# Patient Record
Sex: FEMALE | Race: WHITE | URBAN - NONMETROPOLITAN AREA
[De-identification: names, ages, dates, MRNs, and addresses within clinical notes are randomized per-mention and may not be internally consistent; named-entity substitution may affect disease eponyms.]

---

## 2023-09-25 ENCOUNTER — OFFICE VISIT (OUTPATIENT)
Dept: URBAN - NONMETROPOLITAN AREA CLINIC 4 | Facility: CLINIC | Age: 48
End: 2023-09-25
Payer: COMMERCIAL

## 2023-09-25 ENCOUNTER — WEB ENCOUNTER (OUTPATIENT)
Dept: URBAN - NONMETROPOLITAN AREA CLINIC 4 | Facility: CLINIC | Age: 48
End: 2023-09-25

## 2023-09-25 VITALS
DIASTOLIC BLOOD PRESSURE: 77 MMHG | HEART RATE: 114 BPM | TEMPERATURE: 97.7 F | SYSTOLIC BLOOD PRESSURE: 128 MMHG | WEIGHT: 121.6 LBS | HEIGHT: 66 IN | BODY MASS INDEX: 19.54 KG/M2

## 2023-09-25 DIAGNOSIS — R79.89 ELEVATED LFTS: ICD-10-CM

## 2023-09-25 DIAGNOSIS — Z12.11 SCREENING FOR COLON CANCER: ICD-10-CM

## 2023-09-25 DIAGNOSIS — R17 JAUNDICE: ICD-10-CM

## 2023-09-25 DIAGNOSIS — K75.9 HEPATITIS: ICD-10-CM

## 2023-09-25 PROCEDURE — 99215 OFFICE O/P EST HI 40 MIN: CPT | Performed by: PHYSICIAN ASSISTANT

## 2023-09-25 RX ORDER — PROPRANOLOL HYDROCHLORIDE 20 MG/1
1 TABLET TABLET ORAL THREE TIMES A DAY
Status: ACTIVE | COMMUNITY

## 2023-09-25 NOTE — PHYSICAL EXAM SKIN:
no rashes, jaundice present,  good turgor  telangectasias (vs irritation from scratching) noted to chest

## 2023-09-25 NOTE — HPI-TODAY'S VISIT:
Lela nicholson is a 51 year old  female with past medical history of alcohol use presents to the office today secondary to new onset painless jaundice which she noticed on her skin and whites of her eyes.  She noticed some skin itching recently, and the whites of her eyes were found to be yellow. She went to an urgent care and routine labs were done which revealed an AST of 178 with an ALT of 65 and a total bilirubin of 9.5  She denies any abdominal pain, states that her last drink was five days ago and she has every intention of being sober long term  Her itching has resolved.  About five years ago she was admitted to the hospital for alcohol withdrawal, was sober for about two years after that episode and recently started drinking again  No family history of liver disease, no new medications recently

## 2023-09-25 NOTE — PHYSICAL EXAM CONSTITUTIONAL:
normal,  alert,  pleasant, well nourished, in no acute distress,  well developed, well nourished,  ambulating without difficulty  thin, but does not appear malnourished

## 2023-09-26 LAB
ABSOLUTE BASOPHILS: 75
ABSOLUTE EOSINOPHILS: 25
ABSOLUTE LYMPHOCYTES: 2025
ABSOLUTE MONOCYTES: 1325
ABSOLUTE NEUTROPHILS: 9050
ALBUMIN/GLOBULIN RATIO: 0.9
ALBUMIN: 3.6
ALKALINE PHOSPHATASE: 247
ALT: 63
AST: 157
BASOPHILS: 0.6
BILIRUBIN, TOTAL: 10
BUN/CREATININE RATIO: (no result)
CALCIUM: 8.8
CARBON DIOXIDE: 26
CHLORIDE: 97
CREATININE: 0.57
EGFR: 110
EOSINOPHILS: 0.2
FIB 4 INDEX: 4.2
FIB 4 INTERPRETATION: (no result)
GLOBULIN: 3.8
GLUCOSE: 88
HEMATOCRIT: 30.1
HEMOGLOBIN: 10.9
INR: 1.7
LYMPHOCYTES: 16.2
MCH: 37.5
MCHC: 36.2
MCV: 103.4
MONOCYTES: 10.6
MPV: 11.6
NEUTROPHILS: 72.4
PLATELET COUNT: 240
PLATELET COUNT: 240
POTASSIUM: 4.4
PROTEIN, TOTAL: 7.4
PT: 17.1
RDW: 14.2
RED BLOOD CELL COUNT: 2.91
SODIUM: 132
UREA NITROGEN (BUN): 10
WHITE BLOOD CELL COUNT: 12.5

## 2023-09-27 ENCOUNTER — TELEPHONE ENCOUNTER (OUTPATIENT)
Dept: URBAN - NONMETROPOLITAN AREA CLINIC 4 | Facility: CLINIC | Age: 48
End: 2023-09-27

## 2023-09-29 ENCOUNTER — TELEPHONE ENCOUNTER (OUTPATIENT)
Dept: URBAN - NONMETROPOLITAN AREA CLINIC 4 | Facility: CLINIC | Age: 48
End: 2023-09-29

## 2023-09-29 ENCOUNTER — LAB OUTSIDE AN ENCOUNTER (OUTPATIENT)
Dept: URBAN - NONMETROPOLITAN AREA CLINIC 4 | Facility: CLINIC | Age: 48
End: 2023-09-29

## 2023-10-02 ENCOUNTER — TELEPHONE ENCOUNTER (OUTPATIENT)
Dept: URBAN - METROPOLITAN AREA CLINIC 54 | Facility: CLINIC | Age: 48
End: 2023-10-02

## 2023-10-04 ENCOUNTER — TELEPHONE ENCOUNTER (OUTPATIENT)
Dept: URBAN - NONMETROPOLITAN AREA CLINIC 4 | Facility: CLINIC | Age: 48
End: 2023-10-04

## 2023-10-04 ENCOUNTER — OFFICE VISIT (OUTPATIENT)
Dept: URBAN - NONMETROPOLITAN AREA CLINIC 4 | Facility: CLINIC | Age: 48
End: 2023-10-04
Payer: COMMERCIAL

## 2023-10-04 VITALS
HEART RATE: 95 BPM | TEMPERATURE: 97.5 F | SYSTOLIC BLOOD PRESSURE: 121 MMHG | DIASTOLIC BLOOD PRESSURE: 79 MMHG | WEIGHT: 126 LBS | HEIGHT: 66 IN | BODY MASS INDEX: 20.25 KG/M2

## 2023-10-04 DIAGNOSIS — K75.9 HEPATITIS: ICD-10-CM

## 2023-10-04 DIAGNOSIS — R79.89 ELEVATED LFTS: ICD-10-CM

## 2023-10-04 DIAGNOSIS — K70.31 ALCOHOLIC CIRRHOSIS OF LIVER WITH ASCITES: ICD-10-CM

## 2023-10-04 DIAGNOSIS — R17 JAUNDICE: ICD-10-CM

## 2023-10-04 PROCEDURE — 996 AG2 (NON BILLABLE): Performed by: PHYSICIAN ASSISTANT

## 2023-10-04 RX ORDER — SPIRONOLACTONE 25 MG/1
1 TABLET TABLET, FILM COATED ORAL DAILY
Qty: 30 TABLET | Refills: 0 | OUTPATIENT
Start: 2023-10-04 | End: 2023-11-03

## 2023-10-04 RX ORDER — PROPRANOLOL HYDROCHLORIDE 20 MG/1
1 TABLET TABLET ORAL THREE TIMES A DAY
Status: ACTIVE | COMMUNITY

## 2023-10-04 RX ORDER — ZOLPIDEM TARTRATE 5 MG/1
1 TABLET AT BEDTIME AS NEEDED TABLET, FILM COATED ORAL ONCE A DAY
Qty: 30 TABLET | Refills: 0 | OUTPATIENT
Start: 2023-10-05

## 2023-10-04 NOTE — HPI-TODAY'S VISIT:
Lela nicholson is a 51 year old  female with past medical history of alcohol use presents to the office today secondary to new onset painless jaundice which she noticed on her skin and whites of her eyes.  She noticed some skin itching recently, and the whites of her eyes were found to be yellow. She went to an urgent care and routine labs were done which revealed an AST of 178 with an ALT of 65 and a total bilirubin of 9.5  She denies any abdominal pain, states that her last drink was five days ago and she has every intention of being sober long term  Her itching has resolved.  About five years ago she was admitted to the hospital for alcohol withdrawal, was sober for about two years after that episode and recently started drinking again  No family history of liver disease, no new medications recently  10.4.23 Tracy asked patient to come back with her fiance given her multiple questions and anxiety regarding her new diagnosis  CT abd pel with cirrhosis, as well as ascites in pelvis, with abd/esophgeal varicies. No PVT, no HCC  She continues to look improved, has been sober from EtOH for 15 days and is using AA.  Her fiance is here with her, and he is very supportive.

## 2023-10-06 ENCOUNTER — TELEPHONE ENCOUNTER (OUTPATIENT)
Dept: URBAN - NONMETROPOLITAN AREA CLINIC 4 | Facility: CLINIC | Age: 48
End: 2023-10-06

## 2023-10-09 ENCOUNTER — TELEPHONE ENCOUNTER (OUTPATIENT)
Dept: URBAN - NONMETROPOLITAN AREA CLINIC 4 | Facility: CLINIC | Age: 48
End: 2023-10-09

## 2023-11-01 ENCOUNTER — OFFICE VISIT (OUTPATIENT)
Dept: URBAN - NONMETROPOLITAN AREA CLINIC 4 | Facility: CLINIC | Age: 48
End: 2023-11-01
Payer: COMMERCIAL

## 2023-11-01 ENCOUNTER — TELEPHONE ENCOUNTER (OUTPATIENT)
Dept: URBAN - NONMETROPOLITAN AREA CLINIC 4 | Facility: CLINIC | Age: 48
End: 2023-11-01

## 2023-11-01 VITALS
SYSTOLIC BLOOD PRESSURE: 159 MMHG | TEMPERATURE: 97.9 F | BODY MASS INDEX: 20.06 KG/M2 | DIASTOLIC BLOOD PRESSURE: 100 MMHG | HEART RATE: 120 BPM | HEIGHT: 66 IN | WEIGHT: 124.8 LBS

## 2023-11-01 DIAGNOSIS — R17 JAUNDICE: ICD-10-CM

## 2023-11-01 DIAGNOSIS — R79.89 ELEVATED LFTS: ICD-10-CM

## 2023-11-01 DIAGNOSIS — K75.89 CHOLESTATIC HEPATITIS: ICD-10-CM

## 2023-11-01 DIAGNOSIS — F10.10 ALCOHOL ABUSE: ICD-10-CM

## 2023-11-01 PROCEDURE — 99214 OFFICE O/P EST MOD 30 MIN: CPT | Performed by: PHYSICIAN ASSISTANT

## 2023-11-01 RX ORDER — SPIRONOLACTONE 25 MG/1
1 TABLET TABLET, FILM COATED ORAL DAILY
Qty: 30 TABLET | Refills: 0 | Status: ACTIVE | COMMUNITY
Start: 2023-10-04 | End: 2023-11-03

## 2023-11-01 RX ORDER — ZOLPIDEM TARTRATE 5 MG/1
1 TABLET AT BEDTIME AS NEEDED TABLET, FILM COATED ORAL ONCE A DAY
Qty: 30 TABLET | Refills: 0 | Status: ACTIVE | COMMUNITY
Start: 2023-10-05

## 2023-11-01 NOTE — HPI-TODAY'S VISIT:
Lela nicholson is a 51 year old  female with past medical history of alcohol use presents to the office today secondary to new onset painless jaundice which she noticed on her skin and whites of her eyes.  She noticed some skin itching recently, and the whites of her eyes were found to be yellow. She went to an urgent care and routine labs were done which revealed an AST of 178 with an ALT of 65 and a total bilirubin of 9.5  She denies any abdominal pain, states that her last drink was five days ago and she has every intention of being sober long term  Her itching has resolved.  About five years ago she was admitted to the hospital for alcohol withdrawal, was sober for about two years after that episode and recently started drinking again  No family history of liver disease, no new medications recently  10.4.23 Tracy asked patient to come back with her fiance given her multiple questions and anxiety regarding her new diagnosis  CT abd pel with cirrhosis, as well as ascites in pelvis, with abd/esophgeal varicies. No PVT, no HCC  She continues to look improved, has been sober from EtOH for 15 days and is using AA.  Her fiance is here with her, and he is very supportive.  11.1.23 Remains sober, now 45 days. Feeling great, no bloating, no LE edema.  No further jaundice, eyes with minimally icteric sclerae  Has been watching her salt, and overall says bowels, body, skin etc much improved.

## 2023-11-02 ENCOUNTER — WEB ENCOUNTER (OUTPATIENT)
Dept: URBAN - NONMETROPOLITAN AREA CLINIC 4 | Facility: CLINIC | Age: 48
End: 2023-11-02

## 2023-11-02 LAB
ALBUMIN/GLOBULIN RATIO: 0.9
ALBUMIN: 3.4
ALKALINE PHOSPHATASE: 120
ALT: 25
AST: 42
BILIRUBIN, TOTAL: 2
BUN/CREATININE RATIO: 18
CALCIUM: 9
CARBON DIOXIDE: 24
CHLORIDE: 104
CREATININE: 0.49
EGFR: 116
FIB 4 INDEX: 2.71
FIB 4 INTERPRETATION: (no result)
GLOBULIN: 3.9
GLUCOSE: 101
INR: 1.2
PLATELET COUNT: 149
POTASSIUM: 3.7
PROTEIN, TOTAL: 7.3
PT: 13
SODIUM: 136
UREA NITROGEN (BUN): 9

## 2023-11-03 ENCOUNTER — TELEPHONE ENCOUNTER (OUTPATIENT)
Dept: URBAN - NONMETROPOLITAN AREA CLINIC 4 | Facility: CLINIC | Age: 48
End: 2023-11-03

## 2023-11-03 RX ORDER — ZOLPIDEM TARTRATE 5 MG/1
1 TABLET AT BEDTIME AS NEEDED TABLET, FILM COATED ORAL ONCE A DAY
Qty: 30 TABLET | Refills: 0
Start: 2023-10-05

## 2023-11-21 ENCOUNTER — WEB ENCOUNTER (OUTPATIENT)
Dept: URBAN - NONMETROPOLITAN AREA CLINIC 4 | Facility: CLINIC | Age: 48
End: 2023-11-21

## 2023-11-21 RX ORDER — ZOLPIDEM TARTRATE 5 MG/1
1 TABLET AT BEDTIME AS NEEDED TABLET, FILM COATED ORAL ONCE A DAY
Qty: 30 TABLET | Refills: 0
Start: 2023-10-05

## 2023-11-29 ENCOUNTER — WEB ENCOUNTER (OUTPATIENT)
Dept: URBAN - NONMETROPOLITAN AREA CLINIC 4 | Facility: CLINIC | Age: 48
End: 2023-11-29

## 2023-12-22 ENCOUNTER — WEB ENCOUNTER (OUTPATIENT)
Dept: URBAN - NONMETROPOLITAN AREA CLINIC 4 | Facility: CLINIC | Age: 48
End: 2023-12-22

## 2024-01-10 ENCOUNTER — OFFICE VISIT (OUTPATIENT)
Dept: URBAN - NONMETROPOLITAN AREA CLINIC 4 | Facility: CLINIC | Age: 49
End: 2024-01-10
Payer: COMMERCIAL

## 2024-01-10 VITALS
BODY MASS INDEX: 21.18 KG/M2 | HEART RATE: 114 BPM | WEIGHT: 131.8 LBS | HEIGHT: 66 IN | SYSTOLIC BLOOD PRESSURE: 139 MMHG | DIASTOLIC BLOOD PRESSURE: 81 MMHG | TEMPERATURE: 98.2 F

## 2024-01-10 DIAGNOSIS — K75.9 HEPATITIS: ICD-10-CM

## 2024-01-10 DIAGNOSIS — R79.89 ELEVATED LFTS: ICD-10-CM

## 2024-01-10 DIAGNOSIS — R17 JAUNDICE: ICD-10-CM

## 2024-01-10 DIAGNOSIS — K70.31 ALCOHOLIC CIRRHOSIS OF LIVER WITH ASCITES: ICD-10-CM

## 2024-01-10 DIAGNOSIS — F10.10 ALCOHOL ABUSE: ICD-10-CM

## 2024-01-10 DIAGNOSIS — Z12.11 SCREENING FOR COLON CANCER: ICD-10-CM

## 2024-01-10 PROBLEM — 15167005: Status: ACTIVE | Noted: 2023-09-25

## 2024-01-10 PROBLEM — 863927004: Status: ACTIVE | Noted: 2023-09-25

## 2024-01-10 PROBLEM — 128241005: Status: ACTIVE | Noted: 2023-09-25

## 2024-01-10 PROBLEM — 420054005: Status: ACTIVE | Noted: 2023-10-04

## 2024-01-10 PROBLEM — 18165001: Status: ACTIVE | Noted: 2023-09-25

## 2024-01-10 PROCEDURE — 99215 OFFICE O/P EST HI 40 MIN: CPT | Performed by: PHYSICIAN ASSISTANT

## 2024-01-10 RX ORDER — MULTIVITAMIN
1 TABLET TABLET ORAL ONCE A DAY
Status: ACTIVE | COMMUNITY

## 2024-01-10 RX ORDER — ZOLPIDEM TARTRATE 5 MG/1
1 TABLET AT BEDTIME AS NEEDED TABLET, FILM COATED ORAL ONCE A DAY
Qty: 30 TABLET | Refills: 0 | Status: ACTIVE | COMMUNITY
Start: 2023-10-05

## 2024-01-10 NOTE — HPI-TODAY'S VISIT:
Lela nicholson is a 51 year old  female with past medical history of alcohol use presents to the office today secondary to new onset painless jaundice which she noticed on her skin and whites of her eyes.  She noticed some skin itching recently, and the whites of her eyes were found to be yellow. She went to an urgent care and routine labs were done which revealed an AST of 178 with an ALT of 65 and a total bilirubin of 9.5  She denies any abdominal pain, states that her last drink was five days ago and she has every intention of being sober long term  Her itching has resolved.  About five years ago she was admitted to the hospital for alcohol withdrawal, was sober for about two years after that episode and recently started drinking again  No family history of liver disease, no new medications recently  10.4.23 Tracy asked patient to come back with her fiance given her multiple questions and anxiety regarding her new diagnosis  CT abd pel with cirrhosis, as well as ascites in pelvis, with abd/esophgeal varicies. No PVT, no HCC  She continues to look improved, has been sober from EtOH for 15 days and is using AA.  Her fiance is here with her, and he is very supportive.  11.1.23 Remains sober, now 45 days. Feeling great, no bloating, no LE edema.  No further jaundice, eyes with minimally icteric sclerae  Has been watching her salt, and overall says bowels, body, skin etc much improved.  1.10.24 Doing really well.  Compensated clincally with no ascites, no LE edema and no jaundice nor PSE  Her last set of labs were with normal INR and normal LFTs  She has been sober 110 days  No previous EGD for EV screening, although imaging with no EVs and o previous cscope for screening.  Grandfather with colon CA at 90

## 2024-01-11 ENCOUNTER — TELEPHONE ENCOUNTER (OUTPATIENT)
Dept: URBAN - METROPOLITAN AREA CLINIC 54 | Facility: CLINIC | Age: 49
End: 2024-01-11

## 2024-01-11 LAB
ABSOLUTE BASOPHILS: 19
ABSOLUTE EOSINOPHILS: 101
ABSOLUTE LYMPHOCYTES: 2564
ABSOLUTE MONOCYTES: 914
ABSOLUTE NEUTROPHILS: 2703
ALBUMIN/GLOBULIN RATIO: 1.3
ALBUMIN: 3.8
ALKALINE PHOSPHATASE: 95
ALT: 19
AST: 29
BASOPHILS: 0.3
BILIRUBIN, TOTAL: 0.8
BUN/CREATININE RATIO: (no result)
CALCIUM: 9.3
CARBON DIOXIDE: 25
CHLORIDE: 103
CREATININE: 0.52
EGFR: 115
EOSINOPHILS: 1.6
FIB 4 INDEX: 1.82
FIB 4 INTERPRETATION: (no result)
FIB 4 SUMMARY: (no result)
GLOBULIN: 3
GLUCOSE: 86
HEMATOCRIT: 33.9
HEMOGLOBIN: 11.6
LYMPHOCYTES: 40.7
MCH: 31.9
MCHC: 34.2
MCV: 93.1
MONOCYTES: 14.5
MPV: 11.2
NEUTROPHILS: 42.9
PLATELET COUNT: 175
PLATELET COUNT: 175
POTASSIUM: 4.1
PROTEIN, TOTAL: 6.8
RDW: 11.6
RED BLOOD CELL COUNT: 3.64
SODIUM: 137
UREA NITROGEN (BUN): 9
WHITE BLOOD CELL COUNT: 6.3

## 2024-02-12 ENCOUNTER — LAB (OUTPATIENT)
Dept: URBAN - METROPOLITAN AREA CLINIC 4 | Facility: CLINIC | Age: 49
End: 2024-02-12
Payer: COMMERCIAL

## 2024-02-12 ENCOUNTER — COL/EGD (OUTPATIENT)
Dept: URBAN - METROPOLITAN AREA SURGERY CENTER 14 | Facility: SURGERY CENTER | Age: 49
End: 2024-02-12
Payer: COMMERCIAL

## 2024-02-12 DIAGNOSIS — K74.60 UNSPECIFIED CIRRHOSIS OF LIVER: ICD-10-CM

## 2024-02-12 DIAGNOSIS — I85.10 SECONDARY ESOPHAGEAL VARICES WITHOUT BLEEDING: ICD-10-CM

## 2024-02-12 DIAGNOSIS — K31.89 OTHER DISEASES OF STOMACH AND DUODENUM: ICD-10-CM

## 2024-02-12 DIAGNOSIS — K29.60 OTHER GASTRITIS WITHOUT BLEEDING: ICD-10-CM

## 2024-02-12 DIAGNOSIS — K31.A0 GASTRIC INTESTINAL METAPLASIA, UNSPECIFIED: ICD-10-CM

## 2024-02-12 DIAGNOSIS — Z12.11 ENCOUNTER FOR SCREENING FOR MALIGNANT NEOPLASM OF COLON: ICD-10-CM

## 2024-02-12 PROCEDURE — 88342 IMHCHEM/IMCYTCHM 1ST ANTB: CPT | Performed by: PATHOLOGY

## 2024-02-12 PROCEDURE — 43239 EGD BIOPSY SINGLE/MULTIPLE: CPT | Performed by: INTERNAL MEDICINE

## 2024-02-12 PROCEDURE — 88305 TISSUE EXAM BY PATHOLOGIST: CPT | Performed by: PATHOLOGY

## 2024-02-12 PROCEDURE — G0121 COLON CA SCRN NOT HI RSK IND: HCPCS | Performed by: INTERNAL MEDICINE

## 2024-02-12 RX ORDER — MULTIVITAMIN
1 TABLET TABLET ORAL ONCE A DAY
Status: ACTIVE | COMMUNITY

## 2024-02-12 RX ORDER — ZOLPIDEM TARTRATE 5 MG/1
1 TABLET AT BEDTIME AS NEEDED TABLET, FILM COATED ORAL ONCE A DAY
Qty: 30 TABLET | Refills: 0 | Status: ACTIVE | COMMUNITY
Start: 2023-10-05

## 2024-03-11 ENCOUNTER — LAB (OUTPATIENT)
Dept: URBAN - NONMETROPOLITAN AREA CLINIC 4 | Facility: CLINIC | Age: 49
End: 2024-03-11

## 2024-03-11 ENCOUNTER — OV EP (OUTPATIENT)
Dept: URBAN - NONMETROPOLITAN AREA CLINIC 4 | Facility: CLINIC | Age: 49
End: 2024-03-11
Payer: COMMERCIAL

## 2024-03-11 VITALS
SYSTOLIC BLOOD PRESSURE: 141 MMHG | TEMPERATURE: 98.4 F | WEIGHT: 132.6 LBS | BODY MASS INDEX: 21.31 KG/M2 | HEART RATE: 101 BPM | HEIGHT: 66 IN | DIASTOLIC BLOOD PRESSURE: 77 MMHG

## 2024-03-11 DIAGNOSIS — K70.31 ALCOHOLIC CIRRHOSIS OF LIVER WITH ASCITES: ICD-10-CM

## 2024-03-11 DIAGNOSIS — Z12.11 SCREENING FOR COLON CANCER: ICD-10-CM

## 2024-03-11 DIAGNOSIS — F10.10 ALCOHOL ABUSE: ICD-10-CM

## 2024-03-11 DIAGNOSIS — R17 JAUNDICE: ICD-10-CM

## 2024-03-11 DIAGNOSIS — K75.9 HEPATITIS: ICD-10-CM

## 2024-03-11 DIAGNOSIS — R79.89 ELEVATED LFTS: ICD-10-CM

## 2024-03-11 PROCEDURE — 99215 OFFICE O/P EST HI 40 MIN: CPT | Performed by: PHYSICIAN ASSISTANT

## 2024-03-11 RX ORDER — ZOLPIDEM TARTRATE 5 MG/1
1 TABLET AT BEDTIME AS NEEDED TABLET, FILM COATED ORAL ONCE A DAY
Qty: 30 TABLET | Refills: 0 | Status: ACTIVE | COMMUNITY
Start: 2023-10-05

## 2024-03-11 RX ORDER — CARVEDILOL 3.12 MG/1
1 TABLET WITH FOOD TABLET, FILM COATED ORAL TWICE A DAY
Qty: 60 | Status: ACTIVE | COMMUNITY
Start: 2024-02-12

## 2024-03-11 RX ORDER — MULTIVITAMIN
1 TABLET TABLET ORAL ONCE A DAY
Status: ACTIVE | COMMUNITY

## 2024-03-11 NOTE — HPI-TODAY'S VISIT:
Lela nicholson is a 51 year old  female with past medical history of alcohol use presents to the office today secondary to new onset painless jaundice which she noticed on her skin and whites of her eyes.  She noticed some skin itching recently, and the whites of her eyes were found to be yellow. She went to an urgent care and routine labs were done which revealed an AST of 178 with an ALT of 65 and a total bilirubin of 9.5  She denies any abdominal pain, states that her last drink was five days ago and she has every intention of being sober long term  Her itching has resolved.  About five years ago she was admitted to the hospital for alcohol withdrawal, was sober for about two years after that episode and recently started drinking again  No family history of liver disease, no new medications recently  10.4.23 Tracy asked patient to come back with her fiance given her multiple questions and anxiety regarding her new diagnosis  CT abd pel with cirrhosis, as well as ascites in pelvis, with abd/esophgeal varicies. No PVT, no HCC  She continues to look improved, has been sober from EtOH for 15 days and is using AA.  Her fiance is here with her, and he is very supportive.  11.1.23 Remains sober, now 45 days. Feeling great, no bloating, no LE edema.  No further jaundice, eyes with minimally icteric sclerae  Has been watching her salt, and overall says bowels, body, skin etc much improved.  1.10.24 Doing really well.  Compensated clincally with no ascites, no LE edema and no jaundice nor PSE  Her last set of labs were with normal INR and normal LFTs  She has been sober 110 days  No previous EGD for EV screening, although imaging with no EVs and o previous cscope for screening.  Grandfather with colon CA at 90  3.11.24 s/p EGD and cscope for screening given cirrhosis  EGD: One column of small (EVs) and moderate erythematous mucosa in antrum Normal duodenum and no evidence of malignancy  bx with chronic inactive gastritis  Colon normal  Started on Coreg 3.125 mg daily    recommended repeat EGD in 2 years, repeat cscope in 10 yrs  163 days sober really doing so well!

## 2024-05-21 ENCOUNTER — TELEPHONE ENCOUNTER (OUTPATIENT)
Dept: URBAN - NONMETROPOLITAN AREA CLINIC 4 | Facility: CLINIC | Age: 49
End: 2024-05-21

## 2024-06-03 ENCOUNTER — WEB ENCOUNTER (OUTPATIENT)
Dept: URBAN - NONMETROPOLITAN AREA CLINIC 4 | Facility: CLINIC | Age: 49
End: 2024-06-03

## 2024-09-09 ENCOUNTER — OFFICE VISIT (OUTPATIENT)
Dept: URBAN - NONMETROPOLITAN AREA CLINIC 4 | Facility: CLINIC | Age: 49
End: 2024-09-09

## 2024-09-11 ENCOUNTER — WEB ENCOUNTER (OUTPATIENT)
Dept: URBAN - NONMETROPOLITAN AREA CLINIC 4 | Facility: CLINIC | Age: 49
End: 2024-09-11

## 2024-09-11 ENCOUNTER — OFFICE VISIT (OUTPATIENT)
Dept: URBAN - NONMETROPOLITAN AREA CLINIC 4 | Facility: CLINIC | Age: 49
End: 2024-09-11
Payer: COMMERCIAL

## 2024-09-11 ENCOUNTER — DASHBOARD ENCOUNTERS (OUTPATIENT)
Age: 49
End: 2024-09-11

## 2024-09-11 ENCOUNTER — LAB OUTSIDE AN ENCOUNTER (OUTPATIENT)
Dept: URBAN - NONMETROPOLITAN AREA CLINIC 4 | Facility: CLINIC | Age: 49
End: 2024-09-11

## 2024-09-11 VITALS
HEART RATE: 108 BPM | SYSTOLIC BLOOD PRESSURE: 139 MMHG | HEIGHT: 66 IN | BODY MASS INDEX: 22.05 KG/M2 | WEIGHT: 137.2 LBS | TEMPERATURE: 98.6 F | DIASTOLIC BLOOD PRESSURE: 87 MMHG

## 2024-09-11 DIAGNOSIS — R17 CHOLESTATIC JAUNDICE: ICD-10-CM

## 2024-09-11 DIAGNOSIS — Z12.11 SCREENING FOR COLON CANCER: ICD-10-CM

## 2024-09-11 DIAGNOSIS — K75.9 HEPATITIS: ICD-10-CM

## 2024-09-11 DIAGNOSIS — K70.31 ALCOHOLIC CIRRHOSIS OF LIVER WITH ASCITES: ICD-10-CM

## 2024-09-11 PROCEDURE — 99215 OFFICE O/P EST HI 40 MIN: CPT | Performed by: PHYSICIAN ASSISTANT

## 2024-09-11 RX ORDER — MULTIVITAMIN
1 TABLET TABLET ORAL ONCE A DAY
Status: ACTIVE | COMMUNITY

## 2024-09-11 RX ORDER — ZOLPIDEM TARTRATE 5 MG/1
1 TABLET AT BEDTIME AS NEEDED TABLET, FILM COATED ORAL ONCE A DAY
Qty: 30 TABLET | Refills: 0 | Status: ON HOLD | COMMUNITY
Start: 2023-10-05

## 2024-09-11 RX ORDER — CARVEDILOL 3.12 MG/1
1 TABLET WITH FOOD TABLET, FILM COATED ORAL TWICE A DAY
Qty: 60 | Refills: 5 | Status: ACTIVE | COMMUNITY
Start: 2024-02-12

## 2024-09-11 RX ORDER — CARVEDILOL 3.12 MG/1
1 TABLET WITH FOOD TABLET, FILM COATED ORAL TWICE A DAY
Qty: 60 | Refills: 5
Start: 2024-02-12

## 2024-09-11 NOTE — HPI-TODAY'S VISIT:
Lela nicholson is a 51 year old  female with past medical history of alcohol use presents to the office today secondary to new onset painless jaundice which she noticed on her skin and whites of her eyes.  She noticed some skin itching recently, and the whites of her eyes were found to be yellow. She went to an urgent care and routine labs were done which revealed an AST of 178 with an ALT of 65 and a total bilirubin of 9.5  She denies any abdominal pain, states that her last drink was five days ago and she has every intention of being sober long term  Her itching has resolved.  About five years ago she was admitted to the hospital for alcohol withdrawal, was sober for about two years after that episode and recently started drinking again  No family history of liver disease, no new medications recently  10.4.23 Tracy asked patient to come back with her fiance given her multiple questions and anxiety regarding her new diagnosis  CT abd pel with cirrhosis, as well as ascites in pelvis, with abd/esophgeal varicies. No PVT, no HCC  She continues to look improved, has been sober from EtOH for 15 days and is using AA.  Her fiance is here with her, and he is very supportive.  11.1.23 Remains sober, now 45 days. Feeling great, no bloating, no LE edema.  No further jaundice, eyes with minimally icteric sclerae  Has been watching her salt, and overall says bowels, body, skin etc much improved.  1.10.24 Doing really well.  Compensated clincally with no ascites, no LE edema and no jaundice nor PSE  Her last set of labs were with normal INR and normal LFTs  She has been sober 110 days  No previous EGD for EV screening, although imaging with no EVs and o previous cscope for screening.  Grandfather with colon CA at 90  3.11.24 s/p EGD and cscope for screening given cirrhosis  EGD: One column of small (EVs) and moderate erythematous mucosa in antrum Normal duodenum and no evidence of malignancy  bx with chronic inactive gastritis  Colon normal  Started on Coreg 3.125 mg daily    recommended repeat EGD in 2 years, repeat cscope in 10 yrs  163 days sober really doing so well!  9.11.24 doing really well continues to be 1 yr sober she looks great, no signs of decompensated disease

## 2024-09-12 ENCOUNTER — WEB ENCOUNTER (OUTPATIENT)
Dept: URBAN - NONMETROPOLITAN AREA CLINIC 4 | Facility: CLINIC | Age: 49
End: 2024-09-12

## 2024-09-12 LAB
ALBUMIN/GLOBULIN RATIO: 1.3
ALBUMIN: 4.5
ALKALINE PHOSPHATASE: 97
ALT: 14
AST: 22
BILIRUBIN, TOTAL: 0.6
BUN/CREATININE RATIO: (no result)
CALCIUM: 9.4
CARBON DIOXIDE: 27
CHLORIDE: 101
CREATININE: 0.71
EGFR: 105
FIB 4 INDEX: 1.46
FIB 4 INTERPRETATION: (no result)
FIB 4 SUMMARY: (no result)
GLOBULIN: 3.5
GLUCOSE: 76
HEMATOCRIT: 38.9
HEMOGLOBIN: 12.5
INR: 1.1
MCH: 29.2
MCHC: 32.1
MCV: 90.9
MPV: 11.2
PLATELET COUNT: 193
PLATELET COUNT: 193
POTASSIUM: 5.3
PROTEIN, TOTAL: 8
PT: 11.8
RDW: 13.1
RED BLOOD CELL COUNT: 4.28
SODIUM: 135
UREA NITROGEN (BUN): 11
WHITE BLOOD CELL COUNT: 7.7

## 2024-10-08 ENCOUNTER — WEB ENCOUNTER (OUTPATIENT)
Dept: URBAN - NONMETROPOLITAN AREA CLINIC 4 | Facility: CLINIC | Age: 49
End: 2024-10-08

## 2025-03-10 ENCOUNTER — OFFICE VISIT (OUTPATIENT)
Dept: URBAN - NONMETROPOLITAN AREA CLINIC 4 | Facility: CLINIC | Age: 50
End: 2025-03-10
Payer: COMMERCIAL

## 2025-03-10 ENCOUNTER — LAB OUTSIDE AN ENCOUNTER (OUTPATIENT)
Dept: URBAN - NONMETROPOLITAN AREA CLINIC 4 | Facility: CLINIC | Age: 50
End: 2025-03-10

## 2025-03-10 VITALS
HEIGHT: 66 IN | WEIGHT: 144.4 LBS | DIASTOLIC BLOOD PRESSURE: 76 MMHG | HEART RATE: 103 BPM | BODY MASS INDEX: 23.21 KG/M2 | SYSTOLIC BLOOD PRESSURE: 154 MMHG | TEMPERATURE: 98.3 F

## 2025-03-10 DIAGNOSIS — R17 JAUNDICE: ICD-10-CM

## 2025-03-10 DIAGNOSIS — R79.89 ELEVATED LFTS: ICD-10-CM

## 2025-03-10 DIAGNOSIS — K70.31 ALCOHOLIC CIRRHOSIS OF LIVER WITH ASCITES: ICD-10-CM

## 2025-03-10 DIAGNOSIS — Z12.11 SCREENING FOR COLON CANCER: ICD-10-CM

## 2025-03-10 DIAGNOSIS — F10.10 ALCOHOL ABUSE: ICD-10-CM

## 2025-03-10 DIAGNOSIS — K75.9 HEPATITIS: ICD-10-CM

## 2025-03-10 PROCEDURE — 99215 OFFICE O/P EST HI 40 MIN: CPT | Performed by: PHYSICIAN ASSISTANT

## 2025-03-10 RX ORDER — ZOLPIDEM TARTRATE 5 MG/1
1 TABLET AT BEDTIME AS NEEDED TABLET, FILM COATED ORAL ONCE A DAY
Qty: 30 TABLET | Refills: 0 | Status: ON HOLD | COMMUNITY
Start: 2023-10-05

## 2025-03-10 RX ORDER — CARVEDILOL 3.12 MG/1
1 TABLET WITH FOOD TABLET, FILM COATED ORAL TWICE A DAY
Qty: 60 | Refills: 5 | Status: ACTIVE | COMMUNITY
Start: 2024-02-12

## 2025-03-10 RX ORDER — CARVEDILOL 3.12 MG/1
1 TABLET WITH FOOD TABLET, FILM COATED ORAL TWICE A DAY
Qty: 60 | Refills: 5
Start: 2024-02-12

## 2025-03-10 RX ORDER — MULTIVITAMIN
1 TABLET TABLET ORAL ONCE A DAY
Status: ACTIVE | COMMUNITY

## 2025-03-10 RX ORDER — ALPRAZOLAM 0.25 MG/1
1 TABLET TABLET ORAL TWICE A DAY
Status: ACTIVE | COMMUNITY

## 2025-03-10 NOTE — HPI-TODAY'S VISIT:
Lela nicholson is a 51 year old  female with past medical history of alcohol use presents to the office today secondary to new onset painless jaundice which she noticed on her skin and whites of her eyes.  She noticed some skin itching recently, and the whites of her eyes were found to be yellow. She went to an urgent care and routine labs were done which revealed an AST of 178 with an ALT of 65 and a total bilirubin of 9.5  She denies any abdominal pain, states that her last drink was five days ago and she has every intention of being sober long term  Her itching has resolved.  About five years ago she was admitted to the hospital for alcohol withdrawal, was sober for about two years after that episode and recently started drinking again  No family history of liver disease, no new medications recently  10.4.23 Tracy asked patient to come back with her fiance given her multiple questions and anxiety regarding her new diagnosis  CT abd pel with cirrhosis, as well as ascites in pelvis, with abd/esophgeal varicies. No PVT, no HCC  She continues to look improved, has been sober from EtOH for 15 days and is using AA.  Her fiance is here with her, and he is very supportive.  11.1.23 Remains sober, now 45 days. Feeling great, no bloating, no LE edema.  No further jaundice, eyes with minimally icteric sclerae  Has been watching her salt, and overall says bowels, body, skin etc much improved.  1.10.24 Doing really well.  Compensated clincally with no ascites, no LE edema and no jaundice nor PSE  Her last set of labs were with normal INR and normal LFTs  She has been sober 110 days  No previous EGD for EV screening, although imaging with no EVs and o previous cscope for screening.  Grandfather with colon CA at 90  3.11.24 s/p EGD and cscope for screening given cirrhosis  EGD: One column of small (EVs) and moderate erythematous mucosa in antrum Normal duodenum and no evidence of malignancy  bx with chronic inactive gastritis  Colon normal  Started on Coreg 3.125 mg daily    recommended repeat EGD in 2 years, repeat cscope in 10 yrs  163 days sober really doing so well!  9.11.24 doing really well continues to be 1 yr sober she looks great, no signs of decompensated disease  3.10.25 doing great repeat labs with PCP with normal LFTs last MRI 11/2024 with mildly nodular contour of liver, no evidence of ascites nor PVT nor HCC  she continues to be sober from alcohol

## 2025-03-11 ENCOUNTER — TELEPHONE ENCOUNTER (OUTPATIENT)
Dept: URBAN - NONMETROPOLITAN AREA CLINIC 4 | Facility: CLINIC | Age: 50
End: 2025-03-11

## 2025-03-13 LAB
ABSOLUTE BASOPHILS: 8
ABSOLUTE EOSINOPHILS: 8
ABSOLUTE LYMPHOCYTES: 3610
ABSOLUTE MONOCYTES: 699
ABSOLUTE NEUTROPHILS: 3276
AFP, SERUM: 2
AFP-L3%, SERUM: (no result)
ALBUMIN/GLOBULIN RATIO: 1.4
ALBUMIN: 4.4
ALKALINE PHOSPHATASE: 77
ALT: 12
AST: 19
BASOPHILS: 0.1
BILIRUBIN, TOTAL: 0.5
BUN/CREATININE RATIO: (no result)
CALCIUM: 9
CARBON DIOXIDE: 27
CHLORIDE: 103
CHOL/HDLC RATIO: 2.9
CHOLESTEROL, TOTAL: 167
CREATININE: 0.63
EGFR: 109
EOSINOPHILS: 0.1
FIB 4 INDEX: 1.17
FIB 4 INTERPRETATION: (no result)
FIB 4 SUMMARY: (no result)
FREE THYROXINE INDEX: 1.5
GLOBULIN: 3.1
GLUCOSE: 59
HDL CHOLESTEROL: 57
HEMATOCRIT: 33.9
HEMOGLOBIN: 10.4
INR: 1.1
LDL-CHOLESTEROL: 90
LYMPHOCYTES: 47.5
MCH: 24.5
MCHC: 30.7
MCV: 80
MONOCYTES: 9.2
MPV: 11.2
NEUTROPHILS: 43.1
NON HDL CHOLESTEROL: 110
PLATELET COUNT: 229
PLATELET COUNT: 229
POTASSIUM: 4.2
PROTEIN, TOTAL: 7.5
PT: 11.4
RDW: 14
RED BLOOD CELL COUNT: 4.24
SODIUM: 136
T3 UPTAKE: 29
THYROXINE (T4): 5.1
TRIGLYCERIDES: 103
TSH: 4.5
UREA NITROGEN (BUN): 12
WHITE BLOOD CELL COUNT: 7.6

## 2025-03-17 ENCOUNTER — TELEPHONE ENCOUNTER (OUTPATIENT)
Dept: URBAN - NONMETROPOLITAN AREA CLINIC 4 | Facility: CLINIC | Age: 50
End: 2025-03-17

## 2025-03-24 ENCOUNTER — ERX REFILL RESPONSE (OUTPATIENT)
Dept: URBAN - NONMETROPOLITAN AREA CLINIC 4 | Facility: CLINIC | Age: 50
End: 2025-03-24

## 2025-03-24 RX ORDER — CARVEDILOL 3.12 MG/1
TAKE 1 TABLET BY MOUTH TWICE DAILY TABLET, FILM COATED ORAL
Qty: 60 TABLET | Refills: 3 | OUTPATIENT

## 2025-03-25 ENCOUNTER — TELEPHONE ENCOUNTER (OUTPATIENT)
Dept: URBAN - METROPOLITAN AREA MEDICAL CENTER 28 | Facility: MEDICAL CENTER | Age: 50
End: 2025-03-25

## 2025-04-04 ENCOUNTER — TELEPHONE ENCOUNTER (OUTPATIENT)
Dept: URBAN - METROPOLITAN AREA CLINIC 23 | Facility: CLINIC | Age: 50
End: 2025-04-04

## 2025-04-09 ENCOUNTER — OFFICE VISIT (OUTPATIENT)
Dept: URBAN - METROPOLITAN AREA CLINIC 22 | Facility: CLINIC | Age: 50
End: 2025-04-09

## 2025-05-06 ENCOUNTER — TELEPHONE ENCOUNTER (OUTPATIENT)
Dept: URBAN - NONMETROPOLITAN AREA CLINIC 4 | Facility: CLINIC | Age: 50
End: 2025-05-06

## 2025-05-06 ENCOUNTER — TELEPHONE ENCOUNTER (OUTPATIENT)
Dept: URBAN - METROPOLITAN AREA CLINIC 23 | Facility: CLINIC | Age: 50
End: 2025-05-06

## 2025-05-07 ENCOUNTER — OFFICE VISIT (OUTPATIENT)
Dept: URBAN - METROPOLITAN AREA CLINIC 22 | Facility: CLINIC | Age: 50
End: 2025-05-07

## 2025-06-11 ENCOUNTER — OFFICE VISIT (OUTPATIENT)
Dept: URBAN - METROPOLITAN AREA CLINIC 53 | Facility: CLINIC | Age: 50
End: 2025-06-11
Payer: COMMERCIAL

## 2025-06-11 DIAGNOSIS — N28.1 RENAL CYST, RIGHT: ICD-10-CM

## 2025-06-11 DIAGNOSIS — K70.30 ALCOHOLIC CIRRHOSIS: ICD-10-CM

## 2025-06-11 PROCEDURE — 76705 ECHO EXAM OF ABDOMEN: CPT | Performed by: INTERNAL MEDICINE

## 2025-06-11 RX ORDER — MULTIVITAMIN
1 TABLET TABLET ORAL ONCE A DAY
Status: ACTIVE | COMMUNITY

## 2025-06-11 RX ORDER — ALPRAZOLAM 0.25 MG/1
1 TABLET TABLET ORAL TWICE A DAY
Status: ACTIVE | COMMUNITY

## 2025-06-11 RX ORDER — ZOLPIDEM TARTRATE 5 MG/1
1 TABLET AT BEDTIME AS NEEDED TABLET, FILM COATED ORAL ONCE A DAY
Qty: 30 TABLET | Refills: 0 | Status: ON HOLD | COMMUNITY
Start: 2023-10-05

## 2025-06-11 RX ORDER — CARVEDILOL 3.12 MG/1
TAKE 1 TABLET BY MOUTH TWICE DAILY TABLET, FILM COATED ORAL
Qty: 60 TABLET | Refills: 3 | Status: ACTIVE | COMMUNITY

## 2025-06-11 RX ORDER — CARVEDILOL 3.12 MG/1
1 TABLET WITH FOOD TABLET, FILM COATED ORAL TWICE A DAY
Qty: 60 | Refills: 5 | Status: ACTIVE | COMMUNITY
Start: 2024-02-12

## 2025-07-25 ENCOUNTER — TELEPHONE ENCOUNTER (OUTPATIENT)
Dept: URBAN - NONMETROPOLITAN AREA CLINIC 4 | Facility: CLINIC | Age: 50
End: 2025-07-25

## 2025-08-04 ENCOUNTER — ERX REFILL RESPONSE (OUTPATIENT)
Dept: URBAN - NONMETROPOLITAN AREA CLINIC 4 | Facility: CLINIC | Age: 50
End: 2025-08-04

## 2025-08-04 RX ORDER — CARVEDILOL 3.12 MG/1
TAKE 1 TABLET BY MOUTH TWICE DAILY TABLET, FILM COATED ORAL
Qty: 60 TABLET | Refills: 3